# Patient Record
Sex: MALE | URBAN - METROPOLITAN AREA
[De-identification: names, ages, dates, MRNs, and addresses within clinical notes are randomized per-mention and may not be internally consistent; named-entity substitution may affect disease eponyms.]

---

## 2024-08-05 ENCOUNTER — APPOINTMENT (RX ONLY)
Dept: URBAN - METROPOLITAN AREA CLINIC 151 | Facility: CLINIC | Age: 59
Setting detail: DERMATOLOGY
End: 2024-08-05

## 2024-08-05 DIAGNOSIS — L82.1 OTHER SEBORRHEIC KERATOSIS: ICD-10-CM

## 2024-08-05 DIAGNOSIS — B35.1 TINEA UNGUIUM: ICD-10-CM

## 2024-08-05 DIAGNOSIS — L71.8 OTHER ROSACEA: ICD-10-CM

## 2024-08-05 PROCEDURE — ? PRESCRIPTION

## 2024-08-05 PROCEDURE — ? DIAGNOSIS COMMENT

## 2024-08-05 PROCEDURE — ? SUNSCREEN RECOMMENDATIONS

## 2024-08-05 PROCEDURE — 99203 OFFICE O/P NEW LOW 30 MIN: CPT

## 2024-08-05 PROCEDURE — ? COUNSELING

## 2024-08-05 RX ORDER — AZELAIC ACID 0.15 G/G
GEL TOPICAL
Qty: 50 | Refills: 4 | Status: ERX | COMMUNITY
Start: 2024-08-05

## 2024-08-05 RX ADMIN — AZELAIC ACID: 0.15 GEL TOPICAL at 00:00

## 2024-08-05 NOTE — HPI: OTHER
Condition:: Spot check
Please Describe Your Condition:: 58-year-old male, presents for a spot check. He has lesions on his face that he would like treated with liquid nitrogen (LN2). He reports that after using Jublia for toenail fungus for a month and a half, his toenail has turned black. Additionally, he has noticed discoloration underneath his armpits.

## 2024-08-05 NOTE — PROCEDURE: DIAGNOSIS COMMENT
Comment: Lesion of pt concern per HPI. Lesions removed today, low Rodriguez Setting 1.7. Advised pt to use aquaphor 2x daily and strict photo protection
Detail Level: Detailed
Render Risk Assessment In Note?: no
Comment: Lesion of pt concern per HPI. Discussed the nature and etiology of condition. Recommended pt to see a podiatrist. Referred pt to Dr. Dat Victor.
Comment: Mild to moderate on exam. Discussed the nature and etiology of condition. Pt was educated on the SE's and benefits down below. Will start azelaic acid 15 % topical gel.

## 2024-08-07 RX ORDER — AZELAIC ACID 0.15 G/G
GEL TOPICAL
Qty: 50 | Refills: 4 | Status: ERX

## 2024-10-15 ENCOUNTER — APPOINTMENT (RX ONLY)
Dept: URBAN - METROPOLITAN AREA CLINIC 151 | Facility: CLINIC | Age: 59
Setting detail: DERMATOLOGY
End: 2024-10-15

## 2024-10-16 ENCOUNTER — RX ONLY (OUTPATIENT)
Age: 59
Setting detail: RX ONLY
End: 2024-10-16

## 2024-10-16 RX ORDER — CLOBETASOL PROPIONATE 0.5 MG/G
OINTMENT TOPICAL
Qty: 60 | Refills: 0 | Status: ERX | COMMUNITY
Start: 2024-10-16

## 2025-01-15 ENCOUNTER — RX ONLY (RX ONLY)
Age: 60
End: 2025-01-15

## 2025-01-15 RX ORDER — AZELAIC ACID 0.15 G/G
GEL TOPICAL
Qty: 50 | Refills: 4 | Status: ERX